# Patient Record
Sex: MALE | Race: OTHER | NOT HISPANIC OR LATINO
[De-identification: names, ages, dates, MRNs, and addresses within clinical notes are randomized per-mention and may not be internally consistent; named-entity substitution may affect disease eponyms.]

---

## 2020-10-06 PROBLEM — Z00.00 ENCOUNTER FOR PREVENTIVE HEALTH EXAMINATION: Status: ACTIVE | Noted: 2020-10-06

## 2020-10-12 ENCOUNTER — TRANSCRIPTION ENCOUNTER (OUTPATIENT)
Age: 35
End: 2020-10-12

## 2020-10-12 ENCOUNTER — APPOINTMENT (OUTPATIENT)
Dept: RADIOLOGY | Facility: CLINIC | Age: 35
End: 2020-10-12

## 2020-10-12 ENCOUNTER — APPOINTMENT (OUTPATIENT)
Dept: NEUROSURGERY | Facility: CLINIC | Age: 35
End: 2020-10-12
Payer: COMMERCIAL

## 2020-10-12 ENCOUNTER — OUTPATIENT (OUTPATIENT)
Dept: OUTPATIENT SERVICES | Facility: HOSPITAL | Age: 35
LOS: 1 days | End: 2020-10-12
Payer: COMMERCIAL

## 2020-10-12 ENCOUNTER — APPOINTMENT (OUTPATIENT)
Dept: RADIOLOGY | Facility: CLINIC | Age: 35
End: 2020-10-12
Payer: COMMERCIAL

## 2020-10-12 VITALS
SYSTOLIC BLOOD PRESSURE: 138 MMHG | OXYGEN SATURATION: 99 % | DIASTOLIC BLOOD PRESSURE: 85 MMHG | HEIGHT: 72 IN | TEMPERATURE: 97.3 F | HEART RATE: 70 BPM | BODY MASS INDEX: 24.52 KG/M2 | WEIGHT: 181 LBS

## 2020-10-12 DIAGNOSIS — Z78.9 OTHER SPECIFIED HEALTH STATUS: ICD-10-CM

## 2020-10-12 DIAGNOSIS — Z00.8 ENCOUNTER FOR OTHER GENERAL EXAMINATION: ICD-10-CM

## 2020-10-12 PROCEDURE — 99201 OFFICE OUTPATIENT NEW 10 MINUTES: CPT

## 2020-10-12 PROCEDURE — 70260 X-RAY EXAM OF SKULL: CPT | Mod: 26

## 2020-10-12 PROCEDURE — 70260 X-RAY EXAM OF SKULL: CPT

## 2020-10-12 NOTE — REVIEW OF SYSTEMS
[Feeling Tired] : feeling tired [Eye Pain] : eye pain [Negative] : Heme/Lymph [FreeTextEntry2] : Weight Changes

## 2020-10-12 NOTE — CONSULT LETTER
[Dear  ___] : Dear  [unfilled], [Courtesy Letter:] : I had the pleasure of seeing your patient, [unfilled], in my office today. [Sincerely,] : Sincerely, [FreeTextEntry1] : Mark Lucas is a 35-year-old male who presents today for evaluation of a bump on his head.  Patient states he has had this bump on his head for the past 5 years if not longer.  He reports within the past 2 months it has increased in size.  Within the past 2 months he has had headaches accompanied with frontal and left eye pressure.  Patient states he does not require medication for this pain.  He denies any visual changes.  He reports fatigue.   He denies any weakness or difficulties with walking or balance.  He denies nausea or vomiting.  Patient reports approximately a 4 kg weight loss within the past 2 months.  Patient states he has had a a few life changes within the past 2 months.  Patient was evaluated by his primary care physician.  He had a full blood work panel performed approximately 2 weeks ago.  He denies any deficiency other than vitamin D.  He states his CBC was within normal limits.  Patient states at 10 months old he had underwent surgery for an abscess under his left axilla.\par \par There is no imaging to review with the patient.\par \par Patient is alert and oriented.  No distress noted.  Strength to bilateral upper and lower extremities 5/5.  Cranial nerves are intact.  Pupils equal and reactive.  Hearing intact.  Tongue protrudes in midline.  No facial droop noted.  Shoulder shrug equal and normal.  Facial sensation and movement symmetric and normal.  The raised bump is situated at the top of his head.  There is a white center with redness surrounding.  There is no drainage.  It is nontender.  It is soft and immobile.\par \par I have provided the patient with a prescription for an x-ray of the skull and MRI with and without contrast of the brain to evaluate for evaluation of the head mass.  we will follow-up over the phone once imaging is available.  Patient is aware to call with any further questions or concerns or with any new or worsening symptoms. [FreeTextEntry3] : Ijeoma Raines, MSN, FNP-BC\par Nurse Practitioner\par Neurosurgery\par [address] [place] [address], [address]nd floor \[place] [address] \par Office: [phone] \par Fax: [phone]\par \par

## 2020-10-17 ENCOUNTER — OUTPATIENT (OUTPATIENT)
Dept: OUTPATIENT SERVICES | Facility: HOSPITAL | Age: 35
LOS: 1 days | End: 2020-10-17
Payer: COMMERCIAL

## 2020-10-17 ENCOUNTER — APPOINTMENT (OUTPATIENT)
Dept: MRI IMAGING | Facility: CLINIC | Age: 35
End: 2020-10-17
Payer: COMMERCIAL

## 2020-10-17 DIAGNOSIS — Z00.8 ENCOUNTER FOR OTHER GENERAL EXAMINATION: ICD-10-CM

## 2020-10-17 PROCEDURE — A9585: CPT

## 2020-10-17 PROCEDURE — 70553 MRI BRAIN STEM W/O & W/DYE: CPT | Mod: 26

## 2020-10-17 PROCEDURE — 70553 MRI BRAIN STEM W/O & W/DYE: CPT

## 2022-10-10 ENCOUNTER — APPOINTMENT (OUTPATIENT)
Dept: GASTROENTEROLOGY | Facility: CLINIC | Age: 37
End: 2022-10-10

## 2023-03-30 ENCOUNTER — RESULT REVIEW (OUTPATIENT)
Age: 38
End: 2023-03-30

## 2023-03-30 ENCOUNTER — NON-APPOINTMENT (OUTPATIENT)
Age: 38
End: 2023-03-30

## 2023-03-30 ENCOUNTER — APPOINTMENT (OUTPATIENT)
Dept: NEUROLOGY | Facility: CLINIC | Age: 38
End: 2023-03-30
Payer: COMMERCIAL

## 2023-03-30 VITALS
HEART RATE: 76 BPM | SYSTOLIC BLOOD PRESSURE: 143 MMHG | WEIGHT: 185.19 LBS | BODY MASS INDEX: 25.08 KG/M2 | DIASTOLIC BLOOD PRESSURE: 83 MMHG | HEIGHT: 72 IN

## 2023-03-30 PROCEDURE — 99204 OFFICE O/P NEW MOD 45 MIN: CPT

## 2023-03-30 RX ORDER — MELOXICAM 7.5 MG/1
7.5 TABLET ORAL TWICE DAILY
Qty: 60 | Refills: 0 | Status: ACTIVE | COMMUNITY
Start: 2023-03-30 | End: 1900-01-01

## 2023-03-30 RX ORDER — CYCLOBENZAPRINE HYDROCHLORIDE 5 MG/1
5 TABLET, FILM COATED ORAL
Qty: 30 | Refills: 0 | Status: ACTIVE | COMMUNITY
Start: 2023-03-30 | End: 1900-01-01

## 2023-03-30 NOTE — HISTORY OF PRESENT ILLNESS
[FreeTextEntry1] : 37-year-old male with a 45-day history of right occipital pain that radiates to the ipsilateral parietotemporal region and to the neck it is described as a dull pain rated 1/10 and has been lingering for 45 days.  Patient states positive history of going to sleep and waking with a headache neck pain stiffness.  Patient denies any eye pain blurred vision diplopia floaters scotomas dizziness tinnitus nausea vomiting photophobia phonophobia osmophobia onset with sexual activity cognitive dysfunction mood changes or irritability history of head or neck trauma or triggers, snoring and gasping with sleep daytime drowsiness and a.m. dry mouth.  Patient also complains of a indurated lump on the superior aspect of the scalp x10 years.  Patient denies any pain at site.  Past medical history positive for vitamin D deficiency and transient low back pain.  Past surgical history excision of furuncle/carbuncle left axillary.  Current medications none positive anaphylaxis with floxacillin's.  Social history alcohol 1-2 drinks per week no drugs tobacco.  Patient is a  working at cancer [employer] is  with no children.  No significant family medical history contributed at this time.  MRI brain with and without contrast October 2020 was unremarkable.  Radiograph of skull x4 views October 2020 positive for nonspecific faint peripheral calcified hump like prominence measuring approximately 1.5 cm in length protrudes from the superior parietal region.

## 2023-03-30 NOTE — PHYSICAL EXAM
[General Appearance - Alert] : alert [General Appearance - In No Acute Distress] : in no acute distress [Oriented To Time, Place, And Person] : oriented to person, place, and time [Affect] : the affect was normal [Mood] : the mood was normal [Cranial Nerves Optic (II)] : visual acuity intact bilaterally,  visual fields full to confrontation, pupils equal round and reactive to light [Cranial Nerves Oculomotor (III)] : extraocular motion intact [Cranial Nerves Trigeminal (V)] : facial sensation intact symmetrically [Cranial Nerves Facial (VII)] : face symmetrical [Cranial Nerves Vestibulocochlear (VIII)] : hearing was intact bilaterally [Cranial Nerves Glossopharyngeal (IX)] : tongue and palate midline [Cranial Nerves Accessory (XI - Cranial And Spinal)] : head turning and shoulder shrug symmetric [Cranial Nerves Hypoglossal (XII)] : there was no tongue deviation with protrusion [Motor Strength] : muscle strength was normal in all four extremities [Involuntary Movements] : no involuntary movements were seen [Sensation Tactile Decrease] : light touch was intact [Romberg's Sign] : Romberg's sign was negtive [Abnormal Walk] : normal gait [Tremor] : no tremor present [2+] : Patella left 2+ [PERRL With Normal Accommodation] : pupils were equal in size, round, reactive to light, with normal accommodation [Extraocular Movements] : extraocular movements were intact [Hearing Threshold Finger Rub Not Randall] : hearing was normal [Neck Appearance] : the appearance of the neck was normal [Heart Rate And Rhythm] : heart rate was normal and rhythm regular [Heart Sounds] : normal S1 and S2 [Abdomen Soft] : soft [Nail Clubbing] : no clubbing  or cyanosis of the fingernails [Motor Tone] : muscle strength and tone were normal [] : no rash [Skin Lesions] : no skin lesions

## 2023-03-30 NOTE — DISCUSSION/SUMMARY
[FreeTextEntry1] : 37-year-old male with history of headache x45 days located in the right occipital region with diffuse radiation to the temporal parietal region into the neck described as a dull pain rated 1/10.  Patient had complaints of headaches approximately 3 years ago where MRI of the brain was unremarkable.\par \par #Cervicalgia\par #Cyst of skin at superior aspect of scalp\par \par 1.  X-ray x3 views of the cervical spine to evaluate for loss of lordosis and degenerative changes, will call patient with results we will ask effectiveness of below medications\par 2.  Meloxicam 7.5 mg every 12 x4 weeks\par 3.  Cyclobenzaprine 5 mg nightly x4 weeks no refills\par 4.  Referral to dermatology for evaluation of dermal cyst at superior aspect of scalp.\par 4.  Return to clinic in 3 months for follow-up evaluation\par

## 2023-03-30 NOTE — DATA REVIEWED
[de-identified] : MRI brain with and without contrast 10/17/2020: No diffusion restriction or acute ischemia is noted.  No chronic ischemic changes or demyelinating foci are suggested.  No microhemorrhage or mass is noted.  Ventricles are midline and normal in size.  The brainstem and cerebellum are unremarkable in appearance.  No CP angle abnormality is noted.  No mass or collections are deflected.  No abnormality noted with cranial nerves.  The majority of vessels and venous sinuses are grossly patent.  Sinuses and mastoids are clear.  Following contrast administration no enhancing parenchymal or meningeal lesion is noted.  There is an incidental sebaceous cyst in the frontal vertex of the scalp in the midline.  Impression unremarkable pre and postcontrast MRI study of the brain clear sinuses and mastoids. [de-identified] : Radiographs call 10/12/2020 impression nonspecific faint peripheral calcified home plate prominence measuring approximately 1.5 cm in length protrudes from superior parietal region best appreciated on the right lateral view.  Smooth and convex remaining visualized calvarial contours.  No discrete lytic or blastic lesions.  Intact visualized maxillofacial osseous structures.  Clear visualized paranasal sinuses and midline nasal septum

## 2023-03-30 NOTE — REVIEW OF SYSTEMS
[Fever] : no fever [Chills] : no chills [Sleep Disturbances] : no sleep disturbances [Anxiety] : no anxiety [Depression] : no depression [Confused or Disoriented] : no confusion [Memory Lapses or Loss] : no memory loss [Numbness] : no numbness [Tingling] : no tingling [Dizziness] : no dizziness [Vertigo] : no vertigo [Eye Pain] : no eye pain [Earache] : no earache [Loss Of Hearing] : no hearing loss [Heart Rate Is Fast] : the heart rate was not fast [Chest Pain] : no chest pain [Palpitations] : no palpitations [Shortness Of Breath] : no shortness of breath [Wheezing] : no wheezing [Abdominal Pain] : no abdominal pain [Swollen Glands] : no swollen glands [Swollen Glands In The Neck] : no swollen glands in the neck

## 2023-04-01 ENCOUNTER — OUTPATIENT (OUTPATIENT)
Dept: OUTPATIENT SERVICES | Facility: HOSPITAL | Age: 38
LOS: 1 days | End: 2023-04-01
Payer: COMMERCIAL

## 2023-04-01 ENCOUNTER — APPOINTMENT (OUTPATIENT)
Dept: RADIOLOGY | Facility: CLINIC | Age: 38
End: 2023-04-01
Payer: COMMERCIAL

## 2023-04-01 DIAGNOSIS — Z00.8 ENCOUNTER FOR OTHER GENERAL EXAMINATION: ICD-10-CM

## 2023-04-01 DIAGNOSIS — M54.2 CERVICALGIA: ICD-10-CM

## 2023-04-01 PROCEDURE — 72040 X-RAY EXAM NECK SPINE 2-3 VW: CPT

## 2023-04-01 PROCEDURE — 72040 X-RAY EXAM NECK SPINE 2-3 VW: CPT | Mod: 26

## 2023-11-07 ENCOUNTER — APPOINTMENT (OUTPATIENT)
Dept: NEUROLOGY | Facility: CLINIC | Age: 38
End: 2023-11-07
Payer: COMMERCIAL

## 2023-11-07 VITALS
SYSTOLIC BLOOD PRESSURE: 128 MMHG | BODY MASS INDEX: 25.47 KG/M2 | HEIGHT: 72 IN | DIASTOLIC BLOOD PRESSURE: 86 MMHG | TEMPERATURE: 97.8 F | HEART RATE: 73 BPM | WEIGHT: 188 LBS

## 2023-11-07 DIAGNOSIS — M54.2 CERVICALGIA: ICD-10-CM

## 2023-11-07 DIAGNOSIS — S16.1XXA STRAIN OF MUSCLE, FASCIA AND TENDON AT NECK LEVEL, INITIAL ENCOUNTER: ICD-10-CM

## 2023-11-07 PROCEDURE — 99214 OFFICE O/P EST MOD 30 MIN: CPT

## 2023-11-07 RX ORDER — LIDOCAINE 5% 700 MG/1
5 PATCH TOPICAL
Qty: 1 | Refills: 1 | Status: ACTIVE | COMMUNITY
Start: 2023-11-07 | End: 1900-01-01

## 2023-11-22 ENCOUNTER — APPOINTMENT (OUTPATIENT)
Dept: DERMATOLOGY | Facility: CLINIC | Age: 38
End: 2023-11-22
Payer: COMMERCIAL

## 2023-11-22 ENCOUNTER — NON-APPOINTMENT (OUTPATIENT)
Age: 38
End: 2023-11-22

## 2023-11-22 PROCEDURE — 99203 OFFICE O/P NEW LOW 30 MIN: CPT

## 2023-12-21 ENCOUNTER — APPOINTMENT (OUTPATIENT)
Dept: PLASTIC SURGERY | Facility: CLINIC | Age: 38
End: 2023-12-21
Payer: COMMERCIAL

## 2023-12-21 VITALS
WEIGHT: 189.59 LBS | SYSTOLIC BLOOD PRESSURE: 144 MMHG | HEART RATE: 69 BPM | BODY MASS INDEX: 25.68 KG/M2 | HEIGHT: 72 IN | DIASTOLIC BLOOD PRESSURE: 81 MMHG | OXYGEN SATURATION: 98 %

## 2023-12-21 PROCEDURE — 99203 OFFICE O/P NEW LOW 30 MIN: CPT

## 2024-02-05 NOTE — HISTORY OF PRESENT ILLNESS
[FreeTextEntry1] : Mr. CARO ARMSTRONG  is a 38 year  old male  with no pertinent past medical history who presents to discuss a mass on His  scalp which has been present for many months.  He  feels the mass has been slowly growing and becoming increasingly more painful.  He  is worried about its etiology and would like to discuss excision.   non smoker no anticoagulation or bleeding disorders

## 2024-02-05 NOTE — ASSESSMENT
[FreeTextEntry1] : The patient is scheduled for neoplasm extirpation.  I explained that following extirpation there will be a full thickness defect of the involved area.  The reconstructive options will be based on the defect size and surrounding tissue laxity of the involved area.  Primary closure is only possible for smaller defects.  For larger defects, local tissue rearrangement or skin grafting may be necessary.  The patient was explained the risks of each option. Risks following layered primary closure or local tissue rearrangement include wound dehiscence, contour irregularity, bleeding, infection, and parasthesia.  Risks following skin grafting include wound dehiscence, skin graft nonadherence (partial or complete), contour irregularity, bleeding, infection, parasthesia, and donor site complications.   The patient understands all the risks and has provided informed consent

## 2024-03-21 RX ORDER — OXYCODONE 5 MG/1
5 TABLET ORAL
Qty: 10 | Refills: 0 | Status: ACTIVE | COMMUNITY
Start: 2024-03-21 | End: 1900-01-01

## 2024-03-29 ENCOUNTER — APPOINTMENT (OUTPATIENT)
Dept: PLASTIC SURGERY | Facility: AMBULATORY SURGERY CENTER | Age: 39
End: 2024-03-29
Payer: COMMERCIAL

## 2024-03-29 ENCOUNTER — RESULT REVIEW (OUTPATIENT)
Age: 39
End: 2024-03-29

## 2024-03-29 PROCEDURE — 14020 TIS TRNFR S/A/L 10 SQ CM/<: CPT

## 2024-03-29 PROCEDURE — 21014 EXC FACE TUM DEEP 2 CM/>: CPT

## 2024-04-11 ENCOUNTER — APPOINTMENT (OUTPATIENT)
Dept: PLASTIC SURGERY | Facility: CLINIC | Age: 39
End: 2024-04-11
Payer: COMMERCIAL

## 2024-04-11 ENCOUNTER — APPOINTMENT (OUTPATIENT)
Dept: NEUROLOGY | Facility: CLINIC | Age: 39
End: 2024-04-11

## 2024-04-11 DIAGNOSIS — L72.11 PILAR CYST: ICD-10-CM

## 2024-04-11 PROCEDURE — 99024 POSTOP FOLLOW-UP VISIT: CPT

## 2024-04-11 NOTE — PHYSICAL EXAM
[NI] : Normal [de-identified] : incision c/d/i no signs of infection noted or palpable fluid collections prolene sutures in place

## 2024-04-11 NOTE — HISTORY OF PRESENT ILLNESS
[FreeTextEntry1] : Mr. CARO ARMSTRONG  is a 38 year  old male  with no pertinent past medical history who presents to discuss a mass on His  scalp which has been present for many months.  He  feels the mass has been slowly growing and becoming increasingly more painful.  He  is worried about its etiology and would like to discuss excision.  He is now s/p scalp mass removal 3/29 Doing well Here today for suture removal

## 2024-04-11 NOTE — PHYSICAL EXAM
[NI] : Normal [de-identified] : incision c/d/i no signs of infection noted or palpable fluid collections prolene sutures in place

## 2024-04-11 NOTE — ASSESSMENT
[FreeTextEntry1] : Mr. CARO ARMSTRONG is a 38 year old male s/p scalp mass removal 3/29  Doing well, minimal pain Prolene sutures removed without difficulties, area cleansed monitor for redness, swelling, fever, chills Instructed patient to clean area with soapy water twice daily and pat dry he is encouraged to call if there are any changes RTO 4 weeks for final check all pt questions answered

## 2024-05-07 ENCOUNTER — APPOINTMENT (OUTPATIENT)
Dept: PLASTIC SURGERY | Facility: CLINIC | Age: 39
End: 2024-05-07
Payer: COMMERCIAL

## 2024-05-07 DIAGNOSIS — L72.9 FOLLICULAR CYST OF THE SKIN AND SUBCUTANEOUS TISSUE, UNSPECIFIED: ICD-10-CM

## 2024-05-07 PROCEDURE — 99024 POSTOP FOLLOW-UP VISIT: CPT

## 2024-05-07 NOTE — HISTORY OF PRESENT ILLNESS
[FreeTextEntry1] : Mr. CARO ARMSTRONG  is a 38 year  old male  with no pertinent past medical history who presents to discuss a mass on His  scalp which has been present for many months.  He  feels the mass has been slowly growing and becoming increasingly more painful.  He  is worried about its etiology and would like to discuss excision.  He is now s/p scalp mass removal 3/29 Doing well Here today for 6 week follow up  Admits to touching incisions this morning and noticed a bit of blood

## 2024-05-07 NOTE — ASSESSMENT
[FreeTextEntry1] : Mr. CARO ARMSTRONG is a 38 year old male s/p scalp mass removal 3/29  Doing well, minimal pain Advised that he wash the area twice daily and apply bacitracin to ensure healing of the wound monitor for redness, swelling, fever, chills Instructed patient to clean area with soapy water twice daily and pat dry he is encouraged to call if there are any changes RTO 2 weeks for final check all pt questions answered.

## 2024-05-07 NOTE — PHYSICAL EXAM
[NI] : Normal [de-identified] : incision c/d/i no signs of infection noted or palpable fluid collections scabbing noted to incision site, following removal, it was found that there is an anterior 1 cm circular separation to the incision

## 2024-05-21 ENCOUNTER — APPOINTMENT (OUTPATIENT)
Dept: PLASTIC SURGERY | Facility: CLINIC | Age: 39
End: 2024-05-21
Payer: COMMERCIAL

## 2024-05-21 VITALS
OXYGEN SATURATION: 94 % | HEART RATE: 67 BPM | SYSTOLIC BLOOD PRESSURE: 116 MMHG | TEMPERATURE: 98.3 F | DIASTOLIC BLOOD PRESSURE: 75 MMHG

## 2024-05-21 PROCEDURE — 99024 POSTOP FOLLOW-UP VISIT: CPT

## 2024-05-21 NOTE — ASSESSMENT
[FreeTextEntry1] : 37 yo male s/p scalp mass excision 3/29/24 doing ok pt seen and examined with Dr. Adair discussed applying bacitracin to area daily after showers  monitor for redness, swelling, fever, chills he is encouraged to call if there are any changes all pt questions answered

## 2024-05-21 NOTE — PHYSICAL EXAM
[NI] : Normal [de-identified] : scalp incision c/d there are 2, 1 mm areas which are open, bleeding, granulation tissue at base no surrounding signs of infection or active drainage

## 2024-05-21 NOTE — HISTORY OF PRESENT ILLNESS
[FreeTextEntry1] : Mr. CARO ARMSTRONG is a 38 year old male with past medical history of scalp mass who presents now s/p scalp mass excision  3/29/24. He has noticed some bleeding and drainage from the area.  He feels like it is not healing.    Denies fever, chills, nausea, vomiting, diarrhea

## 2024-06-05 ENCOUNTER — APPOINTMENT (OUTPATIENT)
Dept: PLASTIC SURGERY | Facility: CLINIC | Age: 39
End: 2024-06-05
Payer: COMMERCIAL

## 2024-06-05 VITALS
OXYGEN SATURATION: 98 % | SYSTOLIC BLOOD PRESSURE: 112 MMHG | DIASTOLIC BLOOD PRESSURE: 77 MMHG | HEART RATE: 75 BPM | TEMPERATURE: 98.4 F

## 2024-06-05 DIAGNOSIS — R22.0 LOCALIZED SWELLING, MASS AND LUMP, HEAD: ICD-10-CM

## 2024-06-05 PROCEDURE — 99024 POSTOP FOLLOW-UP VISIT: CPT

## 2024-06-05 NOTE — HISTORY OF PRESENT ILLNESS
[FreeTextEntry1] : Mr. CARO ARMSTRONG is a 38 year old male with past medical history of scalp mass who presents now s/p scalp mass excision  3/29/24.  Presents for evaluation of non healing surgical wound.  He states there has not been anymore drainage and the wound has now healed    Denies fever, chills, nausea, vomiting, diarrhea

## 2024-06-05 NOTE — PHYSICAL EXAM
[NI] : Normal [de-identified] : scalp incision well healed, slightly spread scar no surrounding signs of infection or active drainage

## 2024-06-05 NOTE — ASSESSMENT
[FreeTextEntry1] : 39 yo male s/p scalp mass excision 3/29/24 doing well  wound has now healed  monitor for redness, swelling, fever, chills no restrictions sun screen and scar massages discussed at today's visit he is encouraged to call if there are any changes all pt questions answered

## 2025-03-25 ENCOUNTER — APPOINTMENT (OUTPATIENT)
Dept: NEUROLOGY | Facility: CLINIC | Age: 40
End: 2025-03-25

## 2025-08-11 ENCOUNTER — APPOINTMENT (OUTPATIENT)
Age: 40
End: 2025-08-11

## 2025-08-11 ENCOUNTER — EMERGENCY (EMERGENCY)
Facility: HOSPITAL | Age: 40
LOS: 0 days | Discharge: HOME | End: 2025-08-11
Attending: EMERGENCY MEDICINE
Payer: COMMERCIAL

## 2025-08-11 VITALS
HEART RATE: 63 BPM | OXYGEN SATURATION: 100 % | SYSTOLIC BLOOD PRESSURE: 135 MMHG | DIASTOLIC BLOOD PRESSURE: 85 MMHG | TEMPERATURE: 98 F | WEIGHT: 186.95 LBS | RESPIRATION RATE: 20 BRPM

## 2025-08-11 VITALS
RESPIRATION RATE: 16 BRPM | TEMPERATURE: 98 F | OXYGEN SATURATION: 100 % | HEART RATE: 59 BPM | SYSTOLIC BLOOD PRESSURE: 122 MMHG | DIASTOLIC BLOOD PRESSURE: 78 MMHG

## 2025-08-11 VITALS
HEART RATE: 71 BPM | OXYGEN SATURATION: 97 % | SYSTOLIC BLOOD PRESSURE: 136 MMHG | DIASTOLIC BLOOD PRESSURE: 89 MMHG | TEMPERATURE: 98.4 F
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
## 2025-08-11 DIAGNOSIS — T78.40XA ALLERGY, UNSPECIFIED, INITIAL ENCOUNTER: ICD-10-CM
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
## 2025-08-11 PROCEDURE — 96375 TX/PRO/DX INJ NEW DRUG ADDON: CPT

## 2025-08-11 PROCEDURE — 99284 EMERGENCY DEPT VISIT MOD MDM: CPT | Mod: 25

## 2025-08-11 PROCEDURE — 96374 THER/PROPH/DIAG INJ IV PUSH: CPT

## 2025-08-11 PROCEDURE — 99284 EMERGENCY DEPT VISIT MOD MDM: CPT

## 2025-08-11 RX ORDER — PREDNISONE 20 MG/1
1 TABLET ORAL
Qty: 4 | Refills: 0
Start: 2025-08-11 | End: 2025-08-14

## 2025-08-11 RX ORDER — METHYLPREDNISOLONE ACETATE 80 MG/ML
125 INJECTION, SUSPENSION INTRA-ARTICULAR; INTRALESIONAL; INTRAMUSCULAR; SOFT TISSUE ONCE
Refills: 0 | Status: COMPLETED | OUTPATIENT
Start: 2025-08-11 | End: 2025-08-11

## 2025-08-11 RX ADMIN — Medication 20 MILLIGRAM(S): at 16:39

## 2025-08-11 RX ADMIN — METHYLPREDNISOLONE ACETATE 125 MILLIGRAM(S): 80 INJECTION, SUSPENSION INTRA-ARTICULAR; INTRALESIONAL; INTRAMUSCULAR; SOFT TISSUE at 16:42

## 2025-08-11 RX ADMIN — Medication 1000 MILLILITER(S): at 17:16

## 2025-08-11 RX ADMIN — Medication 5 MILLIGRAM(S): at 16:38

## 2025-09-04 ENCOUNTER — NON-APPOINTMENT (OUTPATIENT)
Age: 40
End: 2025-09-04

## 2025-09-05 ENCOUNTER — APPOINTMENT (OUTPATIENT)
Dept: GASTROENTEROLOGY | Facility: CLINIC | Age: 40
End: 2025-09-05
Payer: COMMERCIAL

## 2025-09-05 VITALS
BODY MASS INDEX: 24.38 KG/M2 | DIASTOLIC BLOOD PRESSURE: 74 MMHG | SYSTOLIC BLOOD PRESSURE: 114 MMHG | WEIGHT: 180 LBS | HEIGHT: 72 IN

## 2025-09-05 DIAGNOSIS — R10.32 RIGHT LOWER QUADRANT PAIN: ICD-10-CM

## 2025-09-05 DIAGNOSIS — R10.31 RIGHT LOWER QUADRANT PAIN: ICD-10-CM

## 2025-09-05 DIAGNOSIS — R10.9 UNSPECIFIED ABDOMINAL PAIN: ICD-10-CM

## 2025-09-05 PROCEDURE — 99203 OFFICE O/P NEW LOW 30 MIN: CPT
